# Patient Record
Sex: FEMALE | Race: WHITE
[De-identification: names, ages, dates, MRNs, and addresses within clinical notes are randomized per-mention and may not be internally consistent; named-entity substitution may affect disease eponyms.]

---

## 2018-10-16 ENCOUNTER — HOSPITAL ENCOUNTER (OUTPATIENT)
Dept: HOSPITAL 63 - US | Age: 33
Discharge: HOME | End: 2018-10-16
Payer: OTHER GOVERNMENT

## 2018-10-16 DIAGNOSIS — N83.292: Primary | ICD-10-CM

## 2018-10-16 DIAGNOSIS — N83.291: ICD-10-CM

## 2018-10-16 DIAGNOSIS — N88.8: ICD-10-CM

## 2018-10-16 PROCEDURE — 76856 US EXAM PELVIC COMPLETE: CPT

## 2018-10-16 NOTE — RAD
Transabdominal and transvaginal sonography of the pelvis

 

Clinical indications: Heavy abnormal menses. Pelvic pain. History of 

torsion on the right side.

 

COMPARISON: None available.

 

Transabdominal sonography: The uterus is anteverted in position. The 

longitudinal and AP and transverse dimensions of the uterus are 8.6 cm and

4.6 cm and 5.7 respectively. The endometrial canal is poorly visualized. 

Therefore, transvaginal sonography will be performed. There is a prominent

cyst of the right ovary. Cyst measures 4.1 cm. The left ovary is not 

visualized by transabdominal exam.

 

Transvaginal sonography: The cyst within the right ovary is essentially 

anechoic. There is color Doppler flow within the thin rim of ovarian 

parenchyma around the cyst. This cyst measures 4.2 cm by transvaginal 

exam. There is a smaller follicular cyst within the right ovary measuring 

0.7 cm. The left ovary is seen low within the cul-de-sac posterior to the 

cervix. Multiple cysts of the left ovary are seen. The largest measures 

4.7 cm. Color Doppler flow is seen within the ovarian parenchyma of the 

left ovary. No free fluid is evident. A Nabothian cyst of the cervix is 

seen. The endometrial canal is not abnormally thickened measuring 2 mm. No

uterine mass or fibroid is seen. Echodensity is seen within the 

endometrial canal which may be secondary to air from recent pelvic exam or

could represent calcification from previous surgery or pregnancy.

 

IMPRESSION: Prominent bilateral ovarian cysts which are essentially 

anechoic in nature. Largest cyst on the left side measures 4.7 cm. The 

largest cyst on the right side measures 4.2 cm. These may be followed up 

with a sonogram in one year. Color Doppler flow is seen within both 

ovaries. No free fluid is evident.

 

Electronically signed by: Chirag Pham MD (10/16/2018 3:41 PM) [facility]